# Patient Record
Sex: MALE | Race: WHITE | Employment: FULL TIME | URBAN - METROPOLITAN AREA
[De-identification: names, ages, dates, MRNs, and addresses within clinical notes are randomized per-mention and may not be internally consistent; named-entity substitution may affect disease eponyms.]

---

## 2022-08-28 ENCOUNTER — NURSE TRIAGE (OUTPATIENT)
Dept: NURSING | Facility: CLINIC | Age: 37
End: 2022-08-28

## 2022-08-28 NOTE — TELEPHONE ENCOUNTER
Tonio gave me permission to speak with his wife, Oneida.  [phone].  Tonio did not want to talk to me.    Had taken Aleve yesterday a.m. for knee pain.      Called EMS out last night.    Patient and wife were at a wedding when episode happened.  Episode from 4pm to 7 pm   Clamminess and sweatiness  Extreme fatigue. Fell asleep sitting up a couple times.  Left eye limp and wasn't tracking  Difficulty walking and talking.  Confusion.  EMS called.  Normal vitals then. Had patient in rig for a while.    EMT's told Oneida that because his vitals were normal they couldn't make him go in.    Calling today for follow up. Wondering what to do and when.  Wife, Oneida said he will not go to an ER and she doubts he'll go to an UC.  She thought he may consider being seen in the clinic.    Per the protocol. I recommended patient be seen within 4 hours.  I asked Oneida what she thought they would do.  She doubted he would go to UC  She said she probably isn't even going to tell him that it's recommended.  I encouraged her to tell him the disposition. I gave her a suggestion of how to word this.     I told Oneida that if another episode occurs to call 911.   Oneida stated understanding and agreement.    Reason for Disposition    [1] Acting confused (e.g., disoriented, slurred speech) AND [2] brief (now gone)    Additional Information    Negative: [1] Difficult to awaken or acting confused (e.g., disoriented, slurred speech) AND [2] present now AND [3] diabetes mellitus    Negative: [1] Difficult to awaken or acting confused (e.g., disoriented, slurred speech) AND [2] present now AND [3] new-onset    Negative: [1] Weakness of the face, arm, or leg on one side of the body AND [2] new-onset    Negative: [1] Numbness of the face, arm, or leg on one side of the body AND [2] new-onset    Negative: [1] Loss of speech or garbled speech AND [2] new-onset    Negative: Difficulty breathing or bluish lips    Negative: Shock  suspected (e.g., cold/pale/clammy skin, too weak to stand, low BP, rapid pulse)    Negative: Seeing, hearing, or feeling things that are not there (i.e., visual, auditory, or tactile hallucinations)    Negative: Followed a head injury    Negative: Drug overdose suspected    Negative: Sounds like a life-threatening emergency to the triager    Negative: Headache or vomiting    Negative: Stiff neck (can't touch chin to chest)    Negative: Very strange or paranoid behavior    Negative: Fever > 100.4 F (38.0 C)    Negative: Patient sounds very sick or weak to the triager    Protocols used: CONFUSION - DELIRIUM-A-AH    Charmaine RODAS RN [facility]

## 2023-03-06 ENCOUNTER — APPOINTMENT (OUTPATIENT)
Dept: OCCUPATIONAL MEDICINE | Facility: CLINIC | Age: 38
End: 2023-03-06

## 2023-03-06 PROCEDURE — 99000 SPECIMEN HANDLING OFFICE-LAB: CPT | Performed by: NURSE PRACTITIONER
